# Patient Record
Sex: FEMALE | Race: ASIAN | NOT HISPANIC OR LATINO | Employment: STUDENT | URBAN - METROPOLITAN AREA
[De-identification: names, ages, dates, MRNs, and addresses within clinical notes are randomized per-mention and may not be internally consistent; named-entity substitution may affect disease eponyms.]

---
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
## 2021-04-07 ENCOUNTER — COMMUNICATION - HEALTHEAST (OUTPATIENT)
Dept: FAMILY MEDICINE | Facility: CLINIC | Age: 21
End: 2021-04-07

## 2021-04-07 ENCOUNTER — OFFICE VISIT - HEALTHEAST (OUTPATIENT)
Dept: FAMILY MEDICINE | Facility: CLINIC | Age: 21
End: 2021-04-07

## 2021-04-07 DIAGNOSIS — Z23 NEED FOR IMMUNIZATION AGAINST INFLUENZA: ICD-10-CM

## 2021-04-07 DIAGNOSIS — R35.0 URINARY FREQUENCY: ICD-10-CM

## 2021-04-07 DIAGNOSIS — B96.89 BV (BACTERIAL VAGINOSIS): ICD-10-CM

## 2021-04-07 DIAGNOSIS — E66.3 OVERWEIGHT (BMI 25.0-29.9): ICD-10-CM

## 2021-04-07 DIAGNOSIS — F51.01 PRIMARY INSOMNIA: ICD-10-CM

## 2021-04-07 DIAGNOSIS — N76.0 BV (BACTERIAL VAGINOSIS): ICD-10-CM

## 2021-04-07 DIAGNOSIS — B37.31 YEAST INFECTION OF THE VAGINA: ICD-10-CM

## 2021-04-07 DIAGNOSIS — Z11.3 SCREEN FOR STD (SEXUALLY TRANSMITTED DISEASE): ICD-10-CM

## 2021-04-07 DIAGNOSIS — N89.8 VAGINAL ITCHING: ICD-10-CM

## 2021-04-07 LAB
ALBUMIN UR-MCNC: NEGATIVE G/DL
ANION GAP SERPL CALCULATED.3IONS-SCNC: 7 MMOL/L (ref 5–18)
APPEARANCE UR: CLEAR
BACTERIA #/AREA URNS HPF: ABNORMAL /[HPF]
BILIRUB UR QL STRIP: NEGATIVE
BUN SERPL-MCNC: 14 MG/DL (ref 8–22)
CALCIUM SERPL-MCNC: 9.1 MG/DL (ref 8.5–10.5)
CHLORIDE BLD-SCNC: 107 MMOL/L (ref 98–107)
CLUE CELLS: ABNORMAL
CO2 SERPL-SCNC: 26 MMOL/L (ref 22–31)
COLOR UR AUTO: YELLOW
CREAT SERPL-MCNC: 0.79 MG/DL (ref 0.6–1.1)
GFR SERPL CREATININE-BSD FRML MDRD: >60 ML/MIN/1.73M2
GLUCOSE BLD-MCNC: 85 MG/DL (ref 70–125)
GLUCOSE UR STRIP-MCNC: NEGATIVE MG/DL
HBA1C MFR BLD: 5.5 %
HGB UR QL STRIP: NEGATIVE
KETONES UR STRIP-MCNC: NEGATIVE MG/DL
LEUKOCYTE ESTERASE UR QL STRIP: NEGATIVE
MUCOUS THREADS #/AREA URNS LPF: ABNORMAL LPF
NITRATE UR QL: NEGATIVE
PH UR STRIP: 6 [PH] (ref 5–8)
POTASSIUM BLD-SCNC: 4.2 MMOL/L (ref 3.5–5)
RBC #/AREA URNS AUTO: ABNORMAL HPF
SODIUM SERPL-SCNC: 140 MMOL/L (ref 136–145)
SP GR UR STRIP: >=1.03 (ref 1–1.03)
SQUAMOUS #/AREA URNS AUTO: ABNORMAL LPF
TRICHOMONAS, WET PREP: ABNORMAL
UROBILINOGEN UR STRIP-ACNC: ABNORMAL
WBC #/AREA URNS AUTO: ABNORMAL HPF
YEAST, WET PREP: ABNORMAL

## 2021-04-07 RX ORDER — HYDROXYZINE HYDROCHLORIDE 25 MG/1
25 TABLET, FILM COATED ORAL
Qty: 90 TABLET | Refills: 3 | Status: SHIPPED | OUTPATIENT
Start: 2021-04-07 | End: 2022-11-07

## 2021-04-07 ASSESSMENT — MIFFLIN-ST. JEOR: SCORE: 1340.05

## 2021-04-08 LAB — BACTERIA SPEC CULT: NO GROWTH

## 2021-04-09 LAB
C TRACH DNA SPEC QL PROBE+SIG AMP: NEGATIVE
N GONORRHOEA DNA SPEC QL NAA+PROBE: NEGATIVE

## 2021-05-30 VITALS — BODY MASS INDEX: 23.46 KG/M2 | WEIGHT: 116.38 LBS | HEIGHT: 59 IN

## 2021-05-30 VITALS — WEIGHT: 116.19 LBS | BODY MASS INDEX: 23.42 KG/M2 | HEIGHT: 59 IN

## 2021-05-30 VITALS — HEIGHT: 59 IN | BODY MASS INDEX: 23.65 KG/M2 | WEIGHT: 117.31 LBS

## 2021-05-31 VITALS — BODY MASS INDEX: 23.27 KG/M2 | HEIGHT: 59 IN | WEIGHT: 115.44 LBS

## 2021-05-31 VITALS — HEIGHT: 59 IN | BODY MASS INDEX: 22.67 KG/M2 | WEIGHT: 112.44 LBS

## 2021-05-31 VITALS — BODY MASS INDEX: 23.59 KG/M2 | HEIGHT: 59 IN | WEIGHT: 117 LBS

## 2021-05-31 NOTE — PROGRESS NOTES
"RADHA Dozier is a 18 y.o. female here for left ear pain.  Began 3 days ago and seems to be radiating up the right side of her head.  It is tender to palpation.  Her right ear does not hurt.  No past medical history on file.  Current Outpatient Medications on File Prior to Visit   Medication Sig Dispense Refill     cholecalciferol, vitamin D3, 5,000 unit capsule Take 5,000 Units by mouth 2 (two) times a week. 8 capsule 2     ibuprofen (ADVIL) 200 MG tablet Take 1 tablet  At bedtime 30 tablet 0     omeprazole (PRILOSEC) 20 MG capsule Take 1 capsule (20 mg total) by mouth 2 (two) times a day. 28 capsule 0     No current facility-administered medications on file prior to visit.      ?  O  /82   Pulse 68   Temp 98.7  F (37.1  C) (Oral)   Resp 16   Ht 4' 10.5\" (1.486 m)   Wt 129 lb 8 oz (58.7 kg)   LMP  (Approximate) Comment: Mid July 2019  SpO2 97% Comment: ra  BMI 26.60 kg/m     Vitals reviewed. Nursing note reviewed.  General Appearance: Pleasant and alert, in no acute distress  HEENT: Left external ear is red and inflamed just around opening.  TM is not bulging.  Right ear appears completely normal.  Mucous membranes moist  Skin: warm, dry, intact, no rash noted  Neuro: no focal deficits, CNs II-XII normal.   Psych: mood and affect are normal.    A/P  La was seen today for ear pain.    Diagnoses and all orders for this visit:    Acute otitis externa of left ear, unspecified type: Return if not improved by 1 week.  -     ofloxacin (FLOXIN) 0.3 % otic solution; Apply 1 drop to left ear 4 times per day for 1 week.         Return in about 3 months (around 11/7/2019) for Well Child Check.      The entire visit was conducted through a professional .   Options for treatment and follow-up care were reviewed with the patient and/or guardian. Mya Shaikh and/or guardian engaged in the decision making process and verbalized understanding of the options discussed and agreed with the final plan.    Betsy" MD Tita

## 2021-06-01 VITALS — WEIGHT: 115.31 LBS | HEIGHT: 59 IN | BODY MASS INDEX: 23.24 KG/M2

## 2021-06-02 VITALS — HEIGHT: 59 IN | BODY MASS INDEX: 24.39 KG/M2 | WEIGHT: 121 LBS

## 2021-06-03 VITALS — WEIGHT: 129.5 LBS | HEIGHT: 59 IN | BODY MASS INDEX: 26.11 KG/M2

## 2021-06-05 VITALS
HEIGHT: 59 IN | TEMPERATURE: 98.2 F | HEART RATE: 84 BPM | SYSTOLIC BLOOD PRESSURE: 100 MMHG | DIASTOLIC BLOOD PRESSURE: 70 MMHG | WEIGHT: 146.25 LBS | BODY MASS INDEX: 29.48 KG/M2 | RESPIRATION RATE: 16 BRPM
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
## 2021-06-08 NOTE — PROGRESS NOTES
"ASSESSMENT AND PLAN:  1. BPPV (benign paroxysmal positional vertigo), unspecified laterality   She complains of being dizzy.  She says the symptoms are persistent for years.  Her mother is giving him herbal medication.  However by physical therapy for repositioning.  Her Romberg's is positive  Ambulatory referral to Physical Therapy   2. Weakness  Basic Metabolic Panel   She complains of weakness she sleeps 2-1/2 hours every day after going home from school.  Hemogram was unremarkable checking ESR hepatic profile BMP today.  She is taking her vitamin D  Erythrocyte Sedimentation Rate    Hepatic Profile    Urinalysis-UC if Indicated   3. Headache her headaches have decreased the use of Naprosyn which she was taking incorrectly like her to continue that medication.        CHIEF COMPLAINT:  Chief Complaint   Patient presents with     Follow-up     pt not too sure, pt states one of the medication that she is taking is causing her dizziness and fatigue.       HISTORY OF PRESENT ILLNESS:  La is a 16 y.o. female presenting for a follow-up. La is present with a Susan  and mother. She is currently taking naproxen and vitamin D. Her headaches have improved but are still present. She notes that her headaches will worsen when she looks at bright lights. She notes that one of the medications is causing her to feel dizzy and fatigued but she notes that her dizziness began before taking the medication. She denies falls and emesis.     REVIEW OF SYSTEMS:   She reports she was seen by opthalmology and was told her eyes are \"good\" but when she reads, her eyes will become red.   She takes a several hour nap when she comes home from school.      She has been taking an herb from [place] for the past 2 years.    All other 10 point review of systems are negative.    PFSH:  Reviewed as below.     TOBACCO USE:  History   Smoking Status     Passive Smoke Exposure - Never Smoker   Smokeless Tobacco     Not on file " "      VITALS:  Vitals:    01/31/17 1616   BP: 114/68   Patient Site: Left Arm   Patient Position: Sitting   Cuff Size: Adult Regular   Pulse: 76   Resp: 20   Temp: 97.9  F (36.6  C)   TempSrc: Oral   Weight: 117 lb 5 oz (53.2 kg)   Height: 4' 10.5\" (1.486 m)     Wt Readings from Last 3 Encounters:   01/31/17 117 lb 5 oz (53.2 kg) (52 %, Z= 0.05)*   01/18/17 116 lb 6 oz (52.8 kg) (50 %, Z= 0.01)*   12/15/16 119 lb 4 oz (54.1 kg) (57 %, Z= 0.16)*     * Growth percentiles are based on [facility] 2-20 Years data.     Body mass index is 24.1 kg/(m^2).    PHYSICAL EXAM:  General: Alert, cooperative, no distress, appears stated age  Neck: No lymph node enlargement.   Ears: Normal TM's and external ear canals, both ears  Back: Symmetric, no curvature, ROM normal, no CVA tenderness  Chest wall: No tenderness or deformity  Heart: Regular rate and rhythm, S1 and S2 normal, no murmur, rub, or gallop  Neurologic:  A & O x 3.  No tremor, no focal findings.   Psych: Appropriate for age.      DATA REVIEWED:  Additional History from Old Records Summarized (2): None  Decision to Obtain Records (1): None  Radiology Tests Summarized or Ordered (1): None  Labs Reviewed or Ordered (1): Labs ordered.  Medicine Test Summarized or Ordered (1): None  Independent Review of EKG or X-RAY(2 each): None    The visit lasted a total of 11 minutes face to face with the patient. Over 50% of the time was spent counseling and educating the patient about dizziness.     Tete SAUCEDA, am scribing for and in the presence of, Dr. Corea.    Dr. Anmol SAUCEDA, personally performed the services described in this documentation, as scribed by Tete Bae in my presence, and it is both accurate and complete.      MEDICATIONS:  Current Outpatient Prescriptions   Medication Sig Dispense Refill     cholecalciferol, vitamin D3, 5,000 unit capsule Take 5,000 Units by mouth 2 (two) times a week. 8 capsule 0     ibuprofen (ADVIL) 200 MG tablet Take 1 tablet  At " bedtime 30 tablet 0     naproxen (NAPROSYN) 375 MG tablet Take 1 tablet (375 mg total) by mouth daily. 30 tablet 0     No current facility-administered medications for this visit.        Total Data Points: 1

## 2021-06-09 NOTE — PROGRESS NOTES
"ASSESSMENT AND PLAN:  1. BPPV (benign paroxysmal positional vertigo), unspecified laterality     Family history symptoms of vertigo decreased.  She needs to increase the fluid content in her diet.  She does not drink much water.  She is scheduled to be seen physical therapy for positional training in the next 2 weeks.      2. Vitamin D deficiency she had been taking vitamin D unclear she's been taking it faithfully refilled the medication however go back in 3 weeks with her medications she says she feels less weak     3. Headache she has been taking the Naprosyn, her headaches have decreased in frequency she did not keep her accurate count.  I'll have her continue same dose follow-upin 3 weeks         CHIEF COMPLAINT:  Chief Complaint   Patient presents with     Follow-up     bilateral ear       HISTORY OF PRESENT ILLNESS:  La is a 16 y.o. female presenting for a follow-up of headaches and dizziness. La is present with a Susan . She is currently taking naproxen for her headaches and states that she has a headache about once a week. She has been able to do more activities and homework due improvement of her frequent headaches. She states that her dizziness has improved since last visit. She has not fallen due to the dizziness. Of note, she drinks 1-2 cups of water a day.     Vitamin D Deficiency: She states that she is taking her Vitamin D faithfully.      REVIEW OF SYSTEMS:   She denies abdominal pain.    All other 10 point review of systems are negative.    PFSH:  Reviewed as below.     TOBACCO USE:  History   Smoking Status     Passive Smoke Exposure - Never Smoker   Smokeless Tobacco     Not on file       VITALS:  Vitals:    02/21/17 1526   BP: 104/66   Patient Site: Left Arm   Patient Position: Sitting   Cuff Size: Adult Regular   Pulse: 80   Resp: 20   Temp: 98.3  F (36.8  C)   TempSrc: Oral   Weight: 116 lb 3 oz (52.7 kg)   Height: 4' 11\" (1.499 m)     Wt Readings from Last 3 Encounters:   02/21/17 " 116 lb 3 oz (52.7 kg) (49 %, Z= -0.02)*   01/31/17 117 lb 5 oz (53.2 kg) (52 %, Z= 0.05)*   01/18/17 116 lb 6 oz (52.8 kg) (50 %, Z= 0.01)*     * Growth percentiles are based on [facility] 2-20 Years data.     Body mass index is 23.47 kg/(m^2).    PHYSICAL EXAM:  General: Alert, cooperative, no distress, appears stated age  Head: Normocephalic, without obvious abnormality, atraumatic  Ears: Normal TM's and external ear canals, both ears  Throat: Lips, mucosa, and tongue normal; teeth and gums normal  Back: Symmetric, no curvature, ROM normal, no CVA tenderness  Lungs: Clear to auscultation bilaterally, respirations unlabored  Chest wall: No tenderness or deformity  Heart: Regular rate and rhythm, S1 and S2 normal, no murmur, rub, or gallop  Neurologic:  A & O x 3.  No tremor, no focal findings.      DATA REVIEWED:  Additional History from Old Records Summarized (2): None   Decision to Obtain Records (1): None  Radiology Tests Summarized or Ordered (1): None  Labs Reviewed or Ordered (1): Reviewed Labs from 1/31/2017.   Medicine Test Summarized or Ordered (1): None  Independent Review of EKG or X-RAY(2 each): None    The visit lasted a total of 10 minutes face to face with the patient. Over 50% of the time was spent counseling and educating the patient about headaches.     ITete, am scribing for and in the presence of, Dr. Corea.    IDr. Corea, personally performed the services described in this documentation, as scribed by Tete Bae in my presence, and it is both accurate and complete.      MEDICATIONS:  Current Outpatient Prescriptions   Medication Sig Dispense Refill     cholecalciferol, vitamin D3, 5,000 unit capsule Take 5,000 Units by mouth 2 (two) times a week. 8 capsule 0     ibuprofen (ADVIL) 200 MG tablet Take 1 tablet  At bedtime 30 tablet 0     naproxen (NAPROSYN) 375 MG tablet Take 1 tablet (375 mg total) by mouth daily. 30 tablet 1     No current facility-administered medications for  this visit.        Total Data Points: 0

## 2021-06-11 NOTE — PROGRESS NOTES
"S:  Patient seen in clinic today for green card exam and update of immunizations.  There is no past history of immunization reactions.  No recent fevers or shortness of breath.     Patient Active Problem List   Diagnosis     BPPV (benign paroxysmal positional vertigo), unspecified laterality     Weakness     Headache       O:  BP 92/70 (Patient Site: Left Arm, Patient Position: Sitting, Cuff Size: Adult Regular)  Pulse 80  Temp 98.3  F (36.8  C) (Oral)   Resp 20  Ht 4' 10.75\" (1.492 m)  Wt 117 lb (53.1 kg)  LMP 06/01/2017  Breastfeeding? No  BMI 23.83 kg/m2  General - alert, NAD  CV - RRR  Resp - lunds clear to auscultation    A/P:  Green Card/update imm.  Counseling done on immunization history and requirements with the patient.  Reviewed available immunization history and relevant lab records with patient and family.  Immunizations given as documented in the EHR and on form.  Acetaminophen as needed for post-immunization fever or myalgias.  [place] Citizenship and Immigration Services form I-693 completed today with the patient.  Given to patient in a sealed labeled envelope and a copy is being scanned into the EHR.  Please see that form for further details.  Patient directed to follow-up in clinic for routine and preventative care.     "

## 2021-06-14 NOTE — PROGRESS NOTES
"ASSESSMENT AND PLAN:  1. H. pylori infection confirmed H. pylori infection by stool test.  Instructions given to patient with mom present.  Triple therapy prescribed for 2 weeks.      CHIEF COMPLAINT:  Chief Complaint   Patient presents with     Follow-up     abnormal lab       HISTORY OF PRESENT ILLNESS:  La is a 17 y.o. female presenting for a follow-up. La is present with a Susan . On 11/17/2017, she presented for an evaluation of abdominal pain. She had an h. Pylori test done which was positive. Currently, she states that she experiences epigastric abdominal pain when she eats spicy foods. Mother is agreeable to starting an h. Pylori antibiotic course.     REVIEW OF SYSTEMS:   She denies nausea and vomiting.    All other 10 point review of systems are negative.    PFSH:   Pertinent past, family, social and medical history reviewed.     TOBACCO USE:  History   Smoking Status     Passive Smoke Exposure - Never Smoker   Smokeless Tobacco     Not on file       VITALS:  Vitals:    12/13/17 1520   BP: 94/64   Patient Site: Left Arm   Patient Position: Sitting   Cuff Size: Adult Regular   Pulse: 80   Temp: 98.4  F (36.9  C)   TempSrc: Oral   SpO2: 98%   Weight: 115 lb 7 oz (52.4 kg)   Height: 4' 11\" (1.499 m)     Wt Readings from Last 3 Encounters:   12/13/17 115 lb 7 oz (52.4 kg) (42 %, Z= -0.21)*   11/17/17 112 lb 7 oz (51 kg) (36 %, Z= -0.37)*   06/21/17 117 lb (53.1 kg) (48 %, Z= -0.04)*     * Growth percentiles are based on CDC 2-20 Years data.     Body mass index is 23.32 kg/(m^2).    PHYSICAL EXAM:  General: Alert, cooperative, no distress, appears stated age  Head: Normocephalic, without obvious abnormality, atraumatic  Neurologic: No tremor, no focal findings.    Psych: Oriented x3. Affect normal.     DATA REVIEWED:  Additional History from Old Records Summarized (2): None  Decision to Obtain Records (1): None  Radiology Tests Summarized or Ordered (1): None  Labs Reviewed or Ordered (1): Reviewed " labs from 11/17/2017.   Medicine Test Summarized or Ordered (1): None  Independent Review of EKG or X-RAY(2 each): None    The visit lasted a total of 11 minutes face to face with the patient. Over 50% of the time was spent counseling and educating the patient about h. pylori treatment.     Tete SAUECDA, am scribing for and in the presence of, Dr. Corea.    blank SAUCEDA, personally performed the services described in this documentation, as scribed by Tete Bae in my presence, and it is both accurate and complete.      MEDICATIONS:  Current Outpatient Prescriptions   Medication Sig Dispense Refill     cholecalciferol, vitamin D3, 5,000 unit capsule Take 5,000 Units by mouth 2 (two) times a week. 8 capsule 2     ibuprofen (ADVIL) 200 MG tablet Take 1 tablet  At bedtime 30 tablet 0     No current facility-administered medications for this visit.        Total Data Points: 1

## 2021-06-14 NOTE — PROGRESS NOTES
"ASSESSMENT AND PLAN:  1. Lower abdominal pain she has intermittent pain that is present on the lower abdominal area.  It is not well localized.  Is not related to exertion, eating habits or bowel movements which are regular.  Mother has been giving her the medication which we can identify.  I want her not to take any of her mother's medicine will do an H. pylori test today child not missed any school no fever chills menstrual cramps have been noted. H. pylori Antigen, Stool    Varicella Zoster Immune Status Antibody, IgG   2. Encounter to vaccinate patient vaccination given today for varicella        CHIEF COMPLAINT:  Chief Complaint   Patient presents with     Abdominal Pain       HISTORY OF PRESENT ILLNESS:  La is a 17 y.o. female presenting with abdominal pain. La is present with a Susan  and mother. For the past few months, she has been experiencing abdominal pain. The pain occurs at least once a week and will persist until she takes her mother's GI medication and goes to sleep. Patient and Mother are unsure which medication it sounds like she is describing ranitidine. She describes the pain as a cramping and twisting pain. There is no correlation between types of food and her menstrual periods to her abdominal pain.     REVIEW OF SYSTEMS:   She currently denies headaches.    All other 10 point review of systems are negative.    PFSH:  . Pertinent past, family, social and medical history reviewed.     TOBACCO USE:  History   Smoking Status     Passive Smoke Exposure - Never Smoker   Smokeless Tobacco     Not on file       VITALS:  Vitals:    11/17/17 1156   BP: (!) 90/64   Patient Site: Right Arm   Patient Position: Sitting   Cuff Size: Adult Regular   Pulse: 80   Resp: 16   Temp: 98.1  F (36.7  C)   TempSrc: Oral   Weight: 112 lb 7 oz (51 kg)   Height: 4' 10.75\" (1.492 m)     Wt Readings from Last 3 Encounters:   11/17/17 112 lb 7 oz (51 kg) (36 %, Z= -0.37)*   06/21/17 117 lb (53.1 kg) (48 %, Z= " -0.04)*   02/21/17 116 lb 3 oz (52.7 kg) (49 %, Z= -0.02)*     * Growth percentiles are based on CDC 2-20 Years data.     Body mass index is 22.9 kg/(m^2).    PHYSICAL EXAM:  General: Alert, cooperative, no distress, appears stated age  Head: Normocephalic, without obvious abnormality, atraumatic  Lungs: Clear to auscultation bilaterally, respirations unlabored  Chest wall: No tenderness or deformity  Heart: Regular rate and rhythm, S1 and S2 normal, no murmur, rub, or gallop  CVS: No edema noted.   Abdomen: Soft, non tender, bowel sounds active all four quadrants, no masses, no organomegaly.  Neurologic: No tremor, no focal findings.     Psych: Oriented x3. Affect normal.     DATA REVIEWED:  Additional History from Old Records Summarized (2): None  Decision to Obtain Records (1): None  Radiology Tests Summarized or Ordered (1): None  Labs Reviewed or Ordered (1): Labs ordered.   Medicine Test Summarized or Ordered (1): None  Independent Review of EKG or X-RAY(2 each): None    The visit lasted a total of 10 minutes face to face with the patient. Over 50% of the time was spent counseling and educating the patient about abdominal pain.     ITete, am scribing for and in the presence of, Dr. Corea.    Iblank, personally performed the services described in this documentation, as scribed by Tete Bae in my presence, and it is both accurate and complete.      MEDICATIONS:  Current Outpatient Prescriptions   Medication Sig Dispense Refill     cholecalciferol, vitamin D3, 5,000 unit capsule Take 5,000 Units by mouth 2 (two) times a week. 8 capsule 2     ibuprofen (ADVIL) 200 MG tablet Take 1 tablet  At bedtime 30 tablet 0     No current facility-administered medications for this visit.        Total Data Points: 1

## 2021-06-15 PROBLEM — H81.10 BPPV (BENIGN PAROXYSMAL POSITIONAL VERTIGO), UNSPECIFIED LATERALITY: Status: ACTIVE | Noted: 2017-01-31

## 2021-06-15 PROBLEM — R51.9 HEADACHE: Status: ACTIVE | Noted: 2017-01-31

## 2021-06-15 PROBLEM — R53.1 WEAKNESS: Status: ACTIVE | Noted: 2017-01-31

## 2021-06-16 NOTE — TELEPHONE ENCOUNTER
Called Mya Shaikh and left VM with Susan  (called several times with no answer). Her wet prep showed both yeast and BV so I will send 2 medications to her pharmacy. Gave her a call back number in case she has questions.     Betsy Rivers MD

## 2021-06-20 NOTE — PROGRESS NOTES
"ASSESSMENT and plan  1. Immunization due  Immunizations were done today after obtaining verbal consent from the parents via phone  - Varicella vaccine subq  - Tdap vaccine,  8yo or older,  IM  - Meningococcal MCV4P    2. Physical exam, pre-employment  She has never been treated for latent TB has no recent cough fever night sweats shortness of breath or loss of weight.  QuantiFERON test will be done today.  - QTF-Mycobacterium tuberculosis by QuantiFERON-TB Gold Plus        There are no Patient Instructions on file for this visit.    Orders Placed This Encounter   Procedures     Varicella vaccine subq     Tdap vaccine,  8yo or older,  IM     Meningococcal MCV4P     QTF-Mycobacterium tuberculosis by QuantiFERON-TB Gold Plus     There are no discontinued medications.    No Follow-up on file.    CHIEF COMPLAINT:  Chief Complaint   Patient presents with     Tuberculosis screening       HISTORY OF PRESENT ILLNESS:  La is a 17 y.o. 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  She reports she is in good health.  She was [place] but has never been hospitalized or treated with long-term medications.  She believes she has never had TB-like symptoms.    REVIEW OF SYSTEMS:   10 point review of systems is negative      PFSH:  Lives with her family is going to grade 10 this year    TOBACCO USE:  History   Smoking Status     Passive Smoke Exposure - Never Smoker   Smokeless Tobacco     Never Used       VITALS:  Vitals:    08/22/18 1015   BP: 100/68   Pulse: 81   Resp: 20   Temp: 98.1  F (36.7  C)   TempSrc: Oral   SpO2: 98%   Weight: 115 lb 5 oz (52.3 kg)   Height: 4' 10.66\" (1.49 m)     Wt Readings from Last 3 Encounters:   08/22/18 115 lb 5 oz (52.3 kg) (37 %, Z= -0.33)*   12/13/17 115 lb 7 oz (52.4 kg) (42 %, Z= -0.21)*   11/17/17 112 lb 7 oz (51 kg) (36 %, Z= -0.37)*     * Growth percentiles are based on CDC 2-20 Years data.       PHYSICAL " EXAM:  Interactive teenager sitting in exam room in no acute distress  HEENT neck supple mucous membranes moist no lymph enlargement  Respiratory system clear to auscultation equal breath sounds no wheeze no crackles  CVS regular and rhythm no murmurs rubs gallops appreciated  Abdomen soft there is no focal tenderness no hepatosplenomegaly  Lymphatic system no lymph enlargement noted the neck axilla or supraclavicular areas    DATA REVIEWED:  Additional History from Old Records Summarized (2): 0  Decision to Obtain Records (1): 0  Radiology Tests Summarized or Ordered (1): 0  Labs Reviewed or Ordered (1): [email or web address]  Medicine Test Summarized or Ordered (1): 0  Independent Review of EKG or X-RAY(2 each): 0    The visit lasted a total of 25 minutes face to face with the patient. Over 50% of the time was spent counseling and educating the patient about latent tb.    MEDICATIONS:  Current Outpatient Prescriptions   Medication Sig Dispense Refill     cholecalciferol, vitamin D3, 5,000 unit capsule Take 5,000 Units by mouth 2 (two) times a week. 8 capsule 2     ibuprofen (ADVIL) 200 MG tablet Take 1 tablet  At bedtime 30 tablet 0     omeprazole (PRILOSEC) 20 MG capsule Take 1 capsule (20 mg total) by mouth 2 (two) times a day. 28 capsule 0     No current facility-administered medications for this visit.        Please note that this clinical encounter uses voice recognition software, there may be typographical errors present

## 2021-06-21 NOTE — PROGRESS NOTES
[facility] Well Child Check    ASSESSMENT & PLAN  Mya Shaikh is a 18  y.o. 1  m.o. who has normal growth and normal development.    1. [facility] (well child check)  - Vision Screening  - Hearing Screening  - PHQ9 Depression Screen    2. Failed vision screen  Has glasses at home, not wearing it daily.   Last eye exam more than one year ago.  - Ambulatory referral to Optometry    3. Need for influenza vaccination  - Influenza, Seasonal Quad, Preservative Free 36+ Months    Return to clinic in 1 year for a Well Child Check or sooner as needed    IMMUNIZATIONS/LABS  Immunizations were reviewed and orders were placed as appropriate. and I have discussed the risks and benefits of all of the vaccine components with the patient/parents.  All questions have been answered.    REFERRALS  Dental:  Recommend routine dental care as appropriate., The patient has already established care with a dentist.  Other:  Referrals were made for eye exam    ANTICIPATORY GUIDANCE  I have reviewed age appropriate anticipatory guidance.  Nutrition:  Body Image  Play and Communication:  Appropriate Use of TV and Read Books  Health:  Self-image building  Safety:  Seat Belts  Sexuality:  Safe Sex and STD's    HEALTH HISTORY  Do you have any concerns that you'd like to discuss today?: No concerns       Accompanied by Other Mother was called and agree  patient to be seen: mother at hospital delivery baby   Refills needed? No    Do you have any forms that need to be filled out? No     services provided by: Agency     /Agency Name [facility]   Location of  Services: In person    Are you using a form of contraception? No    If no, would you like to discuss with your clinician today? No        Do you have any significant health concerns in your family history?: No  Family History   Problem Relation Age of Onset     Depression Mother      Since your last visit, have there been any major changes in  your family, such as a move, job change, separation, divorce, or death in the family?: No  Has a lack of transportation kept you from medical appointments?: No    Home  Who lives in your home?:  Parents, 1 brother and 3 sisters   Social History     Social History Narrative     Do you have any concerns about losing your housing?: No  Is your housing safe and comfortable?: Yes  Do you have any trouble with sleep?:  No    Education  What school do you child attend?:  [employer]   What grade are you in?:  10th  How do you perform in school (grades, behavior, attention, homework?: good      Eating  Do you eat regular meals including fruits and vegetables?:  yes  What are you drinking (cow's milk, water, soda, juice, sports drinks, energy drinks, etc)?: cow's milk- skim and water  Have you been worried that you don't have enough food?: No  Do you have concerns about your body or appearance?:  No    Activities  Do you have friends?:  yes  Do you get at least one hour of physical activity per day?:  yes  How many hours a day are you in front of a screen other than for schoolwork (computer, TV, phone)?:  30 minutes   What do you do for exercise?:  Walking to school   Do you have interest/participate in community activities/volunteers/school sports?:  no    MENTAL HEALTH SCREENING  PHQ-2 Total Score: 0 (11/7/2018  4:34 PM)  Depression Follow-up Plan: mental health screening assessment (11/7/2018  4:34 PM)  PHQ-9 Total Score: 0 (11/7/2018  4:34 PM)    VISION/HEARING  Vision: Completed. See Results  Hearing:  Completed. See Results     Hearing Screening    Method: Audiometry    125Hz 250Hz 500Hz 1000Hz 2000Hz 3000Hz 4000Hz 6000Hz 8000Hz   Right ear:   Pass Pass Pass  Pass Pass    Left ear:   Fail Pass Pass  Pass Pass       Visual Acuity Screening    Right eye Left eye Both eyes   Without correction: 10/12.5 10/20 10/12.5   With correction:          TB Risk Assessment:  The patient and/or parent/guardian answer positive to:   "parents born outside of the [place]    Dyslipidemia Risk Screening  Have either of your parents or any of your grandparents had a stroke or heart attack before age 55?: No  Any parents with high cholesterol or currently taking medications to treat?: No     Dental  When was the last time you saw the dentist?: 6-12 months ago       Patient Active Problem List   Diagnosis     BPPV (benign paroxysmal positional vertigo), unspecified laterality     Weakness     Headache       Drugs  Does the patient use tobacco/alcohol/drugs?:  no    Safety  Does the patient have any safety concerns (peer or home)?:  no  Does the patient use safety belts, helmets and other safety equipment?:  yes    Sex  Have you ever had sex?:  No    MEASUREMENTS  Height:  4' 10.66\" (1.49 m)  Weight: 121 lb (54.9 kg)  BMI: Body mass index is 24.72 kg/(m^2).  Blood Pressure: 108/66  Blood pressure percentiles are 54 % systolic and 55 % diastolic based on the 2017 AAP Clinical Practice Guideline. Blood pressure percentile targets: 90: 120/77, 95: 126/80, 95 + 12 mmH/92.    PHYSICAL EXAM  Physical Exam  Head - Normal.  Eyes-symmetric corneal pinpoint reflex, symmetric red reflex.  Extraocular movement intact.  ENT-tympanic membranes are clear bilaterally.  Oropharynx is clear.  Neck-supple, no palpable mass or lymphadenopathy.  CV-regular rate and rhythm with no murmur.  Respiratory-lungs clear to auscultation.  Abdomen-soft, nontender, no palpable masses or organomegaly.  Genitourinary-normal appearance to external genitalia  Extremities-warm with no edema.  Neurologic-cranial nerves II through XII are intact, strength and sensation are symmetric.  Skin-no atypical appearing lesions, no rash.    "

## 2021-08-20 ENCOUNTER — HOSPITAL ENCOUNTER (EMERGENCY)
Facility: HOSPITAL | Age: 21
Discharge: HOME | End: 2021-08-20
Attending: EMERGENCY MEDICINE | Admitting: EMERGENCY MEDICINE
Payer: COMMERCIAL

## 2021-08-20 VITALS
RESPIRATION RATE: 18 BRPM | WEIGHT: 141 LBS | HEIGHT: 59 IN | SYSTOLIC BLOOD PRESSURE: 129 MMHG | OXYGEN SATURATION: 99 % | HEART RATE: 91 BPM | TEMPERATURE: 98.7 F | DIASTOLIC BLOOD PRESSURE: 89 MMHG | BODY MASS INDEX: 28.43 KG/M2

## 2021-08-20 DIAGNOSIS — L50.9 URTICARIA: ICD-10-CM

## 2021-08-20 LAB
ALBUMIN UR-MCNC: 10 MG/DL
APPEARANCE UR: CLEAR
BILIRUB UR QL STRIP: NEGATIVE
COLOR UR AUTO: YELLOW
GLUCOSE UR STRIP-MCNC: NEGATIVE MG/DL
HCG UR QL: NEGATIVE
HGB UR QL STRIP: NEGATIVE
KETONES UR STRIP-MCNC: NEGATIVE MG/DL
LEUKOCYTE ESTERASE UR QL STRIP: NEGATIVE
MUCOUS THREADS #/AREA URNS LPF: PRESENT /LPF
NITRATE UR QL: NEGATIVE
PH UR STRIP: 6 [PH] (ref 5–7)
RBC URINE: 1 /HPF
SP GR UR STRIP: 1.03 (ref 1–1.03)
SQUAMOUS EPITHELIAL: 2 /HPF
UROBILINOGEN UR STRIP-MCNC: <2 MG/DL
WBC URINE: 3 /HPF

## 2021-08-20 PROCEDURE — 250N000013 HC RX MED GY IP 250 OP 250 PS 637: Performed by: EMERGENCY MEDICINE

## 2021-08-20 PROCEDURE — 250N000012 HC RX MED GY IP 250 OP 636 PS 637: Performed by: EMERGENCY MEDICINE

## 2021-08-20 PROCEDURE — 81025 URINE PREGNANCY TEST: CPT | Performed by: EMERGENCY MEDICINE

## 2021-08-20 PROCEDURE — 99283 EMERGENCY DEPT VISIT LOW MDM: CPT

## 2021-08-20 PROCEDURE — 81001 URINALYSIS AUTO W/SCOPE: CPT | Performed by: EMERGENCY MEDICINE

## 2021-08-20 RX ORDER — PREDNISONE 20 MG/1
40 TABLET ORAL ONCE
Status: COMPLETED | OUTPATIENT
Start: 2021-08-20 | End: 2021-08-20

## 2021-08-20 RX ORDER — DIPHENHYDRAMINE HCL 25 MG
25 TABLET ORAL ONCE
Status: COMPLETED | OUTPATIENT
Start: 2021-08-20 | End: 2021-08-20

## 2021-08-20 RX ORDER — PREDNISONE 20 MG/1
TABLET ORAL
Qty: 10 TABLET | Refills: 0 | Status: SHIPPED | OUTPATIENT
Start: 2021-08-20 | End: 2022-11-07

## 2021-08-20 RX ORDER — DIPHENHYDRAMINE HCL 25 MG
25 CAPSULE ORAL EVERY 6 HOURS PRN
Qty: 20 CAPSULE | Refills: 0 | Status: SHIPPED | OUTPATIENT
Start: 2021-08-20 | End: 2022-11-07

## 2021-08-20 RX ADMIN — PREDNISONE 40 MG: 20 TABLET ORAL at 18:31

## 2021-08-20 RX ADMIN — DIPHENHYDRAMINE HCL 25 MG: 25 TABLET ORAL at 18:30

## 2021-08-20 ASSESSMENT — MIFFLIN-ST. JEOR: SCORE: 1320.2

## 2021-08-20 NOTE — ED PROVIDER NOTES
EMERGENCY DEPARTMENT ENCOUNTER      NAME: Mya Shaikh  AGE: 20 year old female  YOB: 2000  MRN: [id number]  EVALUATION DATE & TIME: 8/20/2021  5:38 PM    PCP: Allan Corea    ED PROVIDER: Milagros Burch M.D.      Chief Complaint   Patient presents with     Rash         FINAL IMPRESSION:  1. Urticaria          ED COURSE & MEDICAL DECISION MAKING:    ED Course as of Aug 20 1856   Fri Aug 20, 2021   1855 UA reassuringly without UTI and UPT negative, pt with itchy rash and normal VS, nontoxic appearing, and sparing mucous membranes, counselled change soaps/detergents and clsoe PDM f/u, and begun on steroids/benadryl in the ED. Patient discharged after being provided with extensive anticipatory guidance and given return precautions, importance of PMD follow-up emphasized.           Pertinent Labs & Imaging studies reviewed. (See chart for details)    Surgical mask and gloves worn    5:53 PM I met with the patient for the initial interview and physical examination. Discussed plan for treatment and workup in the ED.       At the conclusion of the encounter I discussed the results of all of the tests and the disposition. The questions were answered. The patient or family acknowledged understanding and was agreeable with the care plan.     MEDICATIONS GIVEN IN THE EMERGENCY:  Medications   predniSONE (DELTASONE) tablet 40 mg (40 mg Oral Given 8/20/21 1831)   diphenhydrAMINE (BENADRYL) tablet 25 mg (25 mg Oral Given 8/20/21 1830)       NEW PRESCRIPTIONS STARTED AT TODAY'S ER VISIT  New Prescriptions    DIPHENHYDRAMINE (BENADRYL) 25 MG CAPSULE    Take 1 capsule (25 mg) by mouth every 6 hours as needed for itching    PREDNISONE (DELTASONE) 20 MG TABLET    Take two tablets (= 40mg) each day for 5 (five) days          =================================================================    HPI      Mya Shaikh is a 20 year old female with no PMHx who presents to the ED today via walk in with rash. Patient developed an itchy  rash to her back ~1 week ago. She states that she was seen in clinic and was given a cream to apply, but the rash has worsened and started spreading to her face despite using the cream. No reported new soaps or detergents used at home. Patient adds that she has had 2-3 days of urinary frequency without dysuria; this feels like prior urinary tract infections. She denies shortness of breath, abdominal pain, nausea, vomiting, or additional symptoms at this time. Denies chance of pregnancy.          REVIEW OF SYSTEMS   All other systems reviewed and are negative except as noted above in HPI.    PAST MEDICAL HISTORY:  History reviewed. No pertinent past medical history.    PAST SURGICAL HISTORY:  History reviewed. No pertinent surgical history.    CURRENT MEDICATIONS:    diphenhydrAMINE (BENADRYL) 25 MG capsule  predniSONE (DELTASONE) 20 MG tablet  cholecalciferol, vitamin D3, 5,000 unit capsule  hydrOXYzine HCL (ATARAX) 25 MG tablet  ibuprofen (ADVIL) 200 MG tablet  omeprazole (PRILOSEC) 20 MG capsule        ALLERGIES:  No Known Allergies    FAMILY HISTORY:  Family History   Problem Relation Age of Onset     Depression Mother        SOCIAL HISTORY:   Social History     Socioeconomic History     Marital status: Single     Spouse name: Not on file     Number of children: Not on file     Years of education: Not on file     Highest education level: Not on file   Occupational History     Not on file   Tobacco Use     Smoking status: Passive Smoke Exposure - Never Smoker     Smokeless tobacco: Never Used     Tobacco comment: dad smokes outside   Substance and Sexual Activity     Alcohol use: No     Drug use: No     Sexual activity: Never   Other Topics Concern     Not on file   Social History Narrative     Not on file     Social Determinants of Health     Financial Resource Strain:      Difficulty of Paying Living Expenses:    Food Insecurity:      Worried About Running Out of Food in the Last Year:      Ran Out of Food in  "the Last Year:    Transportation Needs:      Lack of Transportation (Medical):      Lack of Transportation (Non-Medical):    Physical Activity:      Days of Exercise per Week:      Minutes of Exercise per Session:    Stress:      Feeling of Stress :    Social Connections:      Frequency of Communication with Friends and Family:      Frequency of Social Gatherings with Friends and Family:      Attends Zoroastrianism Services:      Active Member of Clubs or Organizations:      Attends Club or Organization Meetings:      Marital Status:    Intimate Partner Violence:      Fear of Current or Ex-Partner:      Emotionally Abused:      Physically Abused:      Sexually Abused:        VITALS:  Patient Vitals for the past 24 hrs:   BP Temp Temp src Pulse Resp SpO2 Height Weight   08/20/21 1447 129/89 98.7  F (37.1  C) Oral 91 18 99 % 1.499 m (4' 11\") 64 kg (141 lb)       PHYSICAL EXAM    GENERAL: Awake, alert.  In no acute distress.   HEENT: Normocephalic, atraumatic.  Pupils equal, round and reactive.  Conjunctiva normal.  EOMI.  NECK: No stridor or apparent deformity.  PULMONARY: Symmetrical breath sounds without distress.  Lungs clear to auscultation bilaterally without wheezes, rhonchi or rales.  CARDIO: Regular rate and rhythm.  No significant murmur, rub or gallop.  Radial pulses strong and symmetrical.  ABDOMINAL: Abdomen soft, non-distended and non-tender to palpation.  No CVAT, no palpable hepatosplenomegaly.  EXTREMITIES: No lower extremity swelling or edema.    NEURO: Alert and oriented to person, place and time.  Cranial nerves grossly intact.  No focal motor deficit.  PSYCH: Normal mood and affect  SKIN: Upper thoracic back with maculopapular excoriated raised red bumpy rash with involvement of the bilateral cheeks L > R.      LAB:  All pertinent labs reviewed and interpreted.  Results for orders placed or performed during the hospital encounter of 08/20/21   HCG qualitative urine   Result Value Ref Range    hCG Urine " Qualitative Negative Negative   UA with Microscopic reflex to Culture    Specimen: Urine, Clean Catch   Result Value Ref Range    Color Urine Yellow Colorless, Straw, Light Yellow, Yellow    Appearance Urine Clear Clear    Glucose Urine Negative Negative mg/dL    Bilirubin Urine Negative Negative    Ketones Urine Negative Negative mg/dL    Specific Gravity Urine 1.034 (H) 1.001 - 1.030    Blood Urine Negative Negative    pH Urine 6.0 5.0 - 7.0    Protein Albumin Urine 10  (A) Negative mg/dL    Urobilinogen Urine <2.0 <2.0 mg/dL    Nitrite Urine Negative Negative    Leukocyte Esterase Urine Negative Negative    Mucus Urine Present (A) None Seen /LPF    RBC Urine 1 <=2 /HPF    WBC Urine 3 <=5 /HPF    Squamous Epithelials Urine 2 (H) <=1 /HPF             I, Geni Phillips, am serving as a scribe to document services personally performed by Dr. Milagros Burch based on my observation and the provider's statements to me. I, Milagros Burch MD attest that Geni Phillips is acting in a scribe capacity, has observed my performance of the services and has documented them in accordance with my direction.     Milagros Burch MD  08/20/21 4751

## 2021-08-20 NOTE — ED TRIAGE NOTES
Patient has had a rash on her back starting about a week ago, is raised pustules, increasing in size, itchy.

## 2021-08-24 ENCOUNTER — TELEPHONE (OUTPATIENT)
Dept: FAMILY MEDICINE | Facility: CLINIC | Age: 21
End: 2021-08-24

## 2021-08-25 ENCOUNTER — TELEPHONE (OUTPATIENT)
Dept: FAMILY MEDICINE | Facility: CLINIC | Age: 21
End: 2021-08-25

## 2021-08-25 NOTE — TELEPHONE ENCOUNTER
Reason for Call:  Same Day Appointment, Requested Provider:      PCP: Allan Corea    Reason for visit:  TB testing for employment      Duration of symptoms:     Have you been treated for this in the past? Na     Additional comments:  Please call and assist patient with scheduling    Can we leave a detailed message on this number? YES    Phone number patient can be reached at: Cell number on file:    Telephone Information:   Mobile [phone]       Best Time:  anytime    Call taken on 8/25/2021 at 4:27 PM by Patricia Montoya

## 2021-08-26 ENCOUNTER — OFFICE VISIT (OUTPATIENT)
Dept: FAMILY MEDICINE | Facility: CLINIC | Age: 21
End: 2021-08-26
Payer: COMMERCIAL

## 2021-08-26 VITALS
SYSTOLIC BLOOD PRESSURE: 102 MMHG | WEIGHT: 139.56 LBS | OXYGEN SATURATION: 99 % | DIASTOLIC BLOOD PRESSURE: 78 MMHG | HEART RATE: 92 BPM | TEMPERATURE: 98.3 F | BODY MASS INDEX: 28.19 KG/M2

## 2021-08-26 DIAGNOSIS — L30.9 DERMATITIS: Primary | ICD-10-CM

## 2021-08-26 DIAGNOSIS — Z11.1 ENCOUNTER FOR TB TINE TEST: ICD-10-CM

## 2021-08-26 PROCEDURE — 36415 COLL VENOUS BLD VENIPUNCTURE: CPT | Performed by: FAMILY MEDICINE

## 2021-08-26 PROCEDURE — 86481 TB AG RESPONSE T-CELL SUSP: CPT | Performed by: FAMILY MEDICINE

## 2021-08-26 PROCEDURE — 99214 OFFICE O/P EST MOD 30 MIN: CPT | Performed by: FAMILY MEDICINE

## 2021-08-26 RX ORDER — TRIAMCINOLONE ACETONIDE 1 MG/G
CREAM TOPICAL 2 TIMES DAILY
Qty: 85.2 G | Refills: 3 | Status: SHIPPED | OUTPATIENT
Start: 2021-08-26 | End: 2022-04-20

## 2021-08-26 NOTE — PROGRESS NOTES
ASSESSMENT and plan  1. Dermatitis  Area of irritated skin noted on her back.  There is no signs of excoriation presently she will avoid taking Benadryl as it makes her sleepy I did not refill her prednisone after explained the side effects to arrive given her a medium potency corticosteroid to be applied to the area if her symptoms continue she may need allergy testing.  - triamcinolone (KENALOG) 0.1 % external cream; Apply topically 2 times daily  Dispense: 85.2 g; Refill: 3    2. 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  - Quantiferon TB Gold Plus; Future  - Quantiferon TB Gold Plus        There are no Patient Instructions on file for this visit.    No orders of the defined types were placed in this encounter.    There are no discontinued medications.    Return in about 6 months (around 2/26/2022) for Routine preventive.    CHIEF COMPLAINT:  chief complaint    HISTORY OF PRESENT ILLNESS:  La is a 20 year old female   Who is here because she was in the emergency room last week with itchy skin and hives they gave her 2 medications she is no longer itchy but still has a rash on her back she is wondering if she needs a refill of medication or she should continue taking the other medication for itching it makes her very sleepy.  She also wants a TB test because she is continuing to work as a PCA.  She denies any recent travel.  She has not had fever chills night sweats or weakness.  She has had no cough.  Her weights been stable.    REVIEW OF SYSTEMS:     Derm positive for itchy skin on her back which is now improved and is no longer itchy  10 point review of  All other systems are negative.    PFSH:    Social history reviewed    TOBACCO USE:  History   Smoking Status     Passive Smoke Exposure - Never Smoker   Smokeless Tobacco     Never Used     Comment: dad smokes outside       VITALS:  Vitals:     08/26/21 1609   BP: 102/78   BP Location: Right arm   Patient Position: Sitting   Cuff Size: Adult Regular   Pulse: 92   Temp: 98.3  F (36.8  C)   TempSrc: Oral   SpO2: 99%   Weight: 63.3 kg (139 lb 9 oz)     Wt Readings from Last 3 Encounters:   08/26/21 63.3 kg (139 lb 9 oz) (68 %, Z= 0.48)*   08/20/21 64 kg (141 lb) (70 %, Z= 0.53)*   04/07/21 66.3 kg (146 lb 4 oz) (77 %, Z= 0.74)*     * Growth percentiles are based on CDC (Girls, 2-20 Years) data.       PHYSICAL EXAM:    Interactive female sitting comfortably in exam room no acute distress  HEENT neck supple mucous members moist with no lymph enlargement noted in the neck.  Lymphatic system there is no axillary supraclavicular lymph node enlargement noted  Respiratory system clear to auscultation equal breath sounds no wheezes no crackles  CVS regular rate and rhythm no murmurs rubs gallops appreciated  Skin erythema noted on the back in the intrascapular area there is hyperpigmented areas which indicate previous excoriation no lesions noted.    DATA REVIEWED:  Additional History from Old Records Summarized (2): 0  Decision to Obtain Records (1):   Radiology Tests Summarized or Ordered (1): 0  Labs Reviewed or Ordered (1): 0  Medicine Test Summarized or Ordered (1): 0  Independent Review of EKG or X-RAY(2 each): 0    The visit lasted a total of 30 minutes .    MEDICATIONS:  Current Outpatient Medications   Medication Sig Dispense Refill     predniSONE (DELTASONE) 20 MG tablet Take two tablets (= 40mg) each day for 5 (five) days 10 tablet 0     triamcinolone (KENALOG) 0.1 % external cream Apply topically 2 times daily 85.2 g 3     cholecalciferol, vitamin D3, 5,000 unit capsule [CHOLECALCIFEROL, VITAMIN D3, 5,000 UNIT CAPSULE] Take 5,000 Units by mouth 2 (two) times a week. (Patient not taking: Reported on 8/26/2021) 8 capsule 2     diphenhydrAMINE (BENADRYL) 25 MG capsule Take 1 capsule (25 mg) by mouth every 6 hours as needed for itching (Patient not taking:  Reported on 8/26/2021) 20 capsule 0     hydrOXYzine HCL (ATARAX) 25 MG tablet [HYDROXYZINE HCL (ATARAX) 25 MG TABLET] Take 1 tablet (25 mg total) by mouth at bedtime as needed (sleep). (Patient not taking: Reported on 8/26/2021) 90 tablet 3     ibuprofen (ADVIL) 200 MG tablet [IBUPROFEN (ADVIL) 200 MG TABLET] Take 1 tablet  At bedtime (Patient not taking: Reported on 8/26/2021) 30 tablet 0     omeprazole (PRILOSEC) 20 MG capsule [OMEPRAZOLE (PRILOSEC) 20 MG CAPSULE] Take 1 capsule (20 mg total) by mouth 2 (two) times a day. (Patient not taking: Reported on 8/26/2021) 28 capsule 0

## 2021-08-27 ENCOUNTER — OFFICE VISIT (OUTPATIENT)
Dept: FAMILY MEDICINE | Facility: CLINIC | Age: 21
End: 2021-08-27
Payer: COMMERCIAL

## 2021-08-27 VITALS
BODY MASS INDEX: 28.37 KG/M2 | DIASTOLIC BLOOD PRESSURE: 80 MMHG | SYSTOLIC BLOOD PRESSURE: 100 MMHG | RESPIRATION RATE: 20 BRPM | TEMPERATURE: 98.8 F | HEART RATE: 86 BPM | OXYGEN SATURATION: 97 % | HEIGHT: 59 IN | WEIGHT: 140.75 LBS

## 2021-08-27 DIAGNOSIS — L50.9 URTICARIA: Primary | ICD-10-CM

## 2021-08-27 PROCEDURE — 99213 OFFICE O/P EST LOW 20 MIN: CPT | Performed by: FAMILY MEDICINE

## 2021-08-27 ASSESSMENT — MIFFLIN-ST. JEOR: SCORE: 1313.68

## 2021-08-30 LAB
GAMMA INTERFERON BACKGROUND BLD IA-ACNC: 0.6 IU/ML
M TB IFN-G BLD-IMP: NEGATIVE
M TB IFN-G CD4+ BCKGRND COR BLD-ACNC: 9.4 IU/ML
MITOGEN IGNF BCKGRD COR BLD-ACNC: -0.14 IU/ML
MITOGEN IGNF BCKGRD COR BLD-ACNC: 0.03 IU/ML
QUANTIFERON MITOGEN: 10 IU/ML
QUANTIFERON NIL TUBE: 0.6 IU/ML
QUANTIFERON TB1 TUBE: 0.46 IU/ML
QUANTIFERON TB2 TUBE: 0.63

## 2021-09-04 NOTE — PROGRESS NOTES
"    Assessment & Plan     Urticaria    Use the triamcinolone.    Do not receive the second dose of Covid vaccine, due to urticaria.  I listed this as an allergy.             BMI:   Estimated body mass index is 28.76 kg/m  as calculated from the following:    Height as of this encounter: 1.49 m (4' 10.66\").    Weight as of this encounter: 63.8 kg (140 lb 12 oz).           Return in about 1 year (around 8/27/2022) for Routine preventive.    Hang Boston MD, MD  [facility] JESE Roque is a 20 year old who presents for the following health issues     HPI     She received Covid vaccine 8/18/21.  She developed urticaria and went to ER 8/20/21.  Started prednisone.    Saw Dr. Corea yesterday.  Triamcinolone cream was prescribed.  She obtained it, but has not started to use it yet.  She states that the rash is less red.    Current Outpatient Medications   Medication Sig Dispense Refill     cholecalciferol, vitamin D3, 5,000 unit capsule [CHOLECALCIFEROL, VITAMIN D3, 5,000 UNIT CAPSULE] Take 5,000 Units by mouth 2 (two) times a week. (Patient not taking: Reported on 8/26/2021) 8 capsule 2     diphenhydrAMINE (BENADRYL) 25 MG capsule Take 1 capsule (25 mg) by mouth every 6 hours as needed for itching (Patient not taking: Reported on 8/26/2021) 20 capsule 0     hydrOXYzine HCL (ATARAX) 25 MG tablet [HYDROXYZINE HCL (ATARAX) 25 MG TABLET] Take 1 tablet (25 mg total) by mouth at bedtime as needed (sleep). (Patient not taking: Reported on 8/26/2021) 90 tablet 3     ibuprofen (ADVIL) 200 MG tablet [IBUPROFEN (ADVIL) 200 MG TABLET] Take 1 tablet  At bedtime (Patient not taking: Reported on 8/26/2021) 30 tablet 0     omeprazole (PRILOSEC) 20 MG capsule [OMEPRAZOLE (PRILOSEC) 20 MG CAPSULE] Take 1 capsule (20 mg total) by mouth 2 (two) times a day. (Patient not taking: Reported on 8/26/2021) 28 capsule 0     predniSONE (DELTASONE) 20 MG tablet Take two tablets (= 40mg) each day for 5 (five) days " "(Patient not taking: Reported on 8/27/2021) 10 tablet 0     triamcinolone (KENALOG) 0.1 % external cream Apply topically 2 times daily (Patient not taking: Reported on 8/27/2021) 85.2 g 3         Review of Systems   No wheezing, cough, or throat tightness.      Objective    /80 (BP Location: Right arm, Patient Position: Sitting, Cuff Size: Adult Regular)   Pulse 86   Temp 98.8  F (37.1  C) (Oral)   Resp 20   Ht 1.49 m (4' 10.66\")   Wt 63.8 kg (140 lb 12 oz)   LMP 08/16/2021 (Exact Date)   SpO2 97%   BMI 28.76 kg/m    Body mass index is 28.76 kg/m .  Physical Exam   Heart normal  Lungs normal  Skin: most of upper and middle back covered by red papules of varying sizes            "

## 2022-01-10 ENCOUNTER — IMMUNIZATION (OUTPATIENT)
Dept: NURSING | Facility: CLINIC | Age: 22
End: 2022-01-10
Payer: COMMERCIAL

## 2022-01-10 PROCEDURE — 91305 COVID-19,PF,PFIZER (12+ YRS): CPT

## 2022-01-10 PROCEDURE — 0052A COVID-19,PF,PFIZER (12+ YRS): CPT

## 2022-04-20 DIAGNOSIS — L30.9 DERMATITIS: ICD-10-CM

## 2022-04-20 DIAGNOSIS — Z76.0 ENCOUNTER FOR MEDICATION REFILL: Primary | ICD-10-CM

## 2022-04-20 RX ORDER — TRIAMCINOLONE ACETONIDE 1 MG/G
CREAM TOPICAL 2 TIMES DAILY
Qty: 85.2 G | Refills: 3 | Status: SHIPPED | OUTPATIENT
Start: 2022-04-20 | End: 2022-11-07

## 2022-04-20 NOTE — TELEPHONE ENCOUNTER
Reason for Call:  Medication or medication refill:    Do you use a [facility]? No      Name of the pharmacy and phone number for the current request:   [facility] @ [phone]    Name of the medication requested: Triamcinolone 0.1% cream - apply topically to butt twice a day    Office visit - upcoming on 5/25/22 at 340 pm     Can we leave a detailed message on this number? NO    Phone number patient can be reached at: Home number on file [phone] (home)    Best Time: anytime    Call taken on 4/20/2022 at 4:08 PM by Gisela Coughlin CMA, CMT      Patient Quality Outreach    Patient is due for the following:   Depression  -  PHQ-9 Needed  Immunizations  -  Covid    NEXT STEPS:   Patient was scheduled for an appointment.    Type of outreach:    Phone, spoke to patient/parent. 5/25/22 at 340 pm      Questions for provider review:    None     Gisela Coughlin CMA, CMT

## 2022-05-25 ENCOUNTER — OFFICE VISIT (OUTPATIENT)
Dept: FAMILY MEDICINE | Facility: CLINIC | Age: 22
End: 2022-05-25
Payer: COMMERCIAL

## 2022-05-25 VITALS
HEIGHT: 59 IN | HEART RATE: 88 BPM | BODY MASS INDEX: 28.22 KG/M2 | SYSTOLIC BLOOD PRESSURE: 102 MMHG | TEMPERATURE: 98 F | DIASTOLIC BLOOD PRESSURE: 70 MMHG | WEIGHT: 140 LBS | OXYGEN SATURATION: 98 %

## 2022-05-25 DIAGNOSIS — L30.9 DERMATITIS: Primary | ICD-10-CM

## 2022-05-25 PROCEDURE — 99213 OFFICE O/P EST LOW 20 MIN: CPT | Performed by: FAMILY MEDICINE

## 2022-05-25 RX ORDER — CETIRIZINE HYDROCHLORIDE 10 MG/1
10 TABLET ORAL DAILY
Qty: 30 TABLET | Refills: 3 | Status: SHIPPED | OUTPATIENT
Start: 2022-05-25 | End: 2022-11-07

## 2022-05-25 RX ORDER — CLOBETASOL PROPIONATE 0.5 MG/G
CREAM TOPICAL 2 TIMES DAILY
Qty: 60 G | Refills: 1 | Status: SHIPPED | OUTPATIENT
Start: 2022-05-25 | End: 2022-11-07

## 2022-05-25 NOTE — PATIENT INSTRUCTIONS
Prescribed 2 medications for you 1 is a cream that should prevent itching please apply to the area twice a day if you can please do not apply this on your face      I also prescribed a medication to prevent itching take this at bedtime as it does make you sleepy I would like to see you back in approximately 6 weeks

## 2022-05-25 NOTE — PROGRESS NOTES
ASSESSMENT and plan   1. Dermatitis  Mild erythema noted with papular rash on the supra scapular area bilaterally.  Minimal signs of excoriation papules developing on the right side of the neck.  Differential would include potential viral infection i.e. molluscum however there is no central clearing of the papules the skin lesion appears to be more pronounced in more pruritic after bathing so it could indicate histamine release and would point more to the favor an allergy I am increasing the potency of the steroid and I am giving her an antihistamine to try to avoid scratching the area follow-up recommended in 6 weeks  - clobetasol (TEMOVATE) 0.05 % external cream; Apply topically 2 times daily  Dispense: 60 g; Refill: 1  - cetirizine (ZYRTEC) 10 MG tablet; Take 1 tablet (10 mg) by mouth daily  Dispense: 30 tablet; Refill: 3        Patient Instructions   Prescribed 2 medications for you 1 is a cream that should prevent itching please apply to the area twice a day if you can please do not apply this on your face      I also prescribed a medication to prevent itching take this at bedtime as it does make you sleepy I would like to see you back in approximately 6 weeks      Orders Placed This Encounter   Procedures     REVIEW OF HEALTH MAINTENANCE PROTOCOL ORDERS     There are no discontinued medications.    Follow-up in 6 weeks    CHIEF COMPLAINT:  chief complaint rash on back of neck    HISTORY OF PRESENT ILLNESS:  La is a 21 year old female is here for a follow-up of her rash I have previously seen her for this last August she reports that the medication to give her the time did slow the rash down but never eliminated it she said over the last 6 weeks its been very itchy and she is avoided taking hot showers because it seems to complain of the rash.  She says the rash is now spreading from behind her back and neck to the side of her neck.  She denies the rash being present anywhere else.    REVIEW OF SYSTEMS:   Derm  "positive for rash as mentioned in HPI otherwise seven-point review of  All other systems are negative.    PFSH:    Social history reviewed    TOBACCO USE:  History   Smoking Status     Passive Smoke Exposure - Never Smoker   Smokeless Tobacco     Never Used     Comment: dad smokes outside       VITALS:  Vitals:    05/25/22 1551   BP: 102/70   Pulse: 88   Temp: 98  F (36.7  C)   TempSrc: Oral   SpO2: 98%   Weight: 63.5 kg (140 lb)   Height: 1.49 m (4' 10.66\")     Wt Readings from Last 3 Encounters:   05/25/22 63.5 kg (140 lb)   08/27/21 63.8 kg (140 lb 12 oz) (70 %, Z= 0.52)*   08/26/21 63.3 kg (139 lb 9 oz) (68 %, Z= 0.48)*     * Growth percentiles are based on [facility] (Girls, 2-20 Years) data.       PHYSICAL EXAM:  Interactive female sitting comfortably exam room no acute distress  HEENT neck supple mucous members moist there is no lymph enlargement in the neck  Skin multiple papules noted in the supra scapular area bilaterally and in the infrascapular area on the left side.  Signs of excoriation noted in the mid thoracic area 5 papules noted over the right side of the neck.    DATA REVIEWED:  Additional History from Old Records Summarized (2): 0  Decision to Obtain Records (1): 0  Radiology Tests Summarized or Ordered (1): 0  Labs Reviewed or Ordered (1): 0  Medicine Test Summarized or Ordered (1): 0  Independent Review of EKG or X-RAY(2 each): 0    The visit lasted a total of 20 minutes .    MEDICATIONS:  Current Outpatient Medications   Medication Sig Dispense Refill     cetirizine (ZYRTEC) 10 MG tablet Take 1 tablet (10 mg) by mouth daily 30 tablet 3     clobetasol (TEMOVATE) 0.05 % external cream Apply topically 2 times daily 60 g 1     cholecalciferol, vitamin D3, 5,000 unit capsule [CHOLECALCIFEROL, VITAMIN D3, 5,000 UNIT CAPSULE] Take 5,000 Units by mouth 2 (two) times a week. (Patient not taking: Reported on 8/26/2021) 8 capsule 2     diphenhydrAMINE (BENADRYL) 25 MG capsule Take 1 capsule (25 mg) by mouth " every 6 hours as needed for itching (Patient not taking: Reported on 8/26/2021) 20 capsule 0     hydrOXYzine HCL (ATARAX) 25 MG tablet [HYDROXYZINE HCL (ATARAX) 25 MG TABLET] Take 1 tablet (25 mg total) by mouth at bedtime as needed (sleep). (Patient not taking: Reported on 8/26/2021) 90 tablet 3     ibuprofen (ADVIL) 200 MG tablet [IBUPROFEN (ADVIL) 200 MG TABLET] Take 1 tablet  At bedtime (Patient not taking: Reported on 8/26/2021) 30 tablet 0     omeprazole (PRILOSEC) 20 MG capsule [OMEPRAZOLE (PRILOSEC) 20 MG CAPSULE] Take 1 capsule (20 mg total) by mouth 2 (two) times a day. (Patient not taking: Reported on 8/26/2021) 28 capsule 0     predniSONE (DELTASONE) 20 MG tablet Take two tablets (= 40mg) each day for 5 (five) days (Patient not taking: Reported on 8/27/2021) 10 tablet 0     triamcinolone (KENALOG) 0.1 % external cream Apply topically 2 times daily 85.2 g 3

## 2022-07-31 NOTE — PROGRESS NOTES
"RADHA Dozier is a 20 y.o. female here for -  Urinary frequency x1 month. Has to wake up at night, though hasn't wet the bed. Waking up 4-5 times at night.   No pain with urination. No blood in urine.   Doesn't think she is more thirsty than she should be. In the afternoon and evening, she feels thirstier after eating. Tries not to drink too much water before bed.    Neither parent has diabetes      She does have itching of her vaginal area. No unusual discharge.   ?  O  /70   Pulse 84   Temp 98.2  F (36.8  C) (Oral)   Resp 16   Ht 4' 10.75\" (1.492 m)   Wt 146 lb 4 oz (66.3 kg)   LMP  (Approximate) Comment: mid March  Breastfeeding No   BMI 29.79 kg/m     Vitals reviewed. Nursing note reviewed.  General Appearance: Pleasant and alert, in no acute distress  HEENT: mucous membranes moist  Pelvic:  Vulva: normal skin.  No lesions noted.  Nontender.    Vagina: normal appearance, physiologic discharge.   Skin: warm, dry, intact, no rash noted  Neuro: no focal deficits, CNs II-XII normal.   Psych: mood and affect are normal.    A/P  La was seen today for urinary frequency.    Diagnoses and all orders for this visit:    Urinary frequency: UA appears benign, but will get culture to double check for bacteria.  Her wet prep did come back showing both yeast and BV.  I saw this after she had left and called and left a message about treating.  -     Urinalysis-UC if Indicated  -     Cancel: Glucose  -     Basic Metabolic Panel  -     Glycosylated Hemoglobin A1c    Primary insomnia: discussed that at this point, we don't have a good reason for her urinary frequency but sometimes anxiety can affect this. Offered to try hydroxyzine and she agreed.     Yeast infection of vagina:     BV (bacterial vaginosis):     Need for immunization against influenza  -     Influenza, Seasonal Quad, PF =/> 6months    Overweight (BMI 25.0-29.9): Has gained 17 lbs in the past 18 months.    -     Glycosylated Hemoglobin A1c    Screen for " STD (sexually transmitted disease)  -     Chlamydia trachomatis & Neisseria gonorrhoeae, Amplified Detection        Return in about 1 month (around 5/7/2021) for recheck.      The entire visit was conducted through a professional .   Options for treatment and follow-up care were reviewed with the patient and/or guardian. Mya Shaikh and/or guardian engaged in the decision making process and verbalized understanding of the options discussed and agreed with the final plan.    Betsy Rivers MD       English

## 2022-08-03 ENCOUNTER — OFFICE VISIT (OUTPATIENT)
Dept: FAMILY MEDICINE | Facility: CLINIC | Age: 22
End: 2022-08-03
Payer: COMMERCIAL

## 2022-08-03 VITALS
SYSTOLIC BLOOD PRESSURE: 98 MMHG | BODY MASS INDEX: 28.43 KG/M2 | OXYGEN SATURATION: 97 % | DIASTOLIC BLOOD PRESSURE: 66 MMHG | TEMPERATURE: 98.5 F | HEART RATE: 86 BPM | WEIGHT: 141 LBS | RESPIRATION RATE: 20 BRPM | HEIGHT: 59 IN

## 2022-08-03 DIAGNOSIS — Z11.59 ENCOUNTER FOR HEPATITIS C SCREENING TEST FOR LOW RISK PATIENT: ICD-10-CM

## 2022-08-03 DIAGNOSIS — Z23 HIGH PRIORITY FOR COVID-19 VACCINATION: ICD-10-CM

## 2022-08-03 DIAGNOSIS — Z11.4 SCREENING FOR HIV (HUMAN IMMUNODEFICIENCY VIRUS): ICD-10-CM

## 2022-08-03 DIAGNOSIS — Z76.89 ENCOUNTER TO ESTABLISH CARE: Primary | ICD-10-CM

## 2022-08-03 DIAGNOSIS — Z13.220 LIPID SCREENING: ICD-10-CM

## 2022-08-03 LAB
CHOLEST SERPL-MCNC: 217 MG/DL
HDLC SERPL-MCNC: 42 MG/DL
LDLC SERPL CALC-MCNC: 150 MG/DL
NONHDLC SERPL-MCNC: 175 MG/DL
TRIGL SERPL-MCNC: 125 MG/DL

## 2022-08-03 PROCEDURE — 86803 HEPATITIS C AB TEST: CPT | Performed by: FAMILY MEDICINE

## 2022-08-03 PROCEDURE — 36415 COLL VENOUS BLD VENIPUNCTURE: CPT | Performed by: FAMILY MEDICINE

## 2022-08-03 PROCEDURE — 0054A COVID-19,PF,PFIZER (12+ YRS): CPT | Performed by: FAMILY MEDICINE

## 2022-08-03 PROCEDURE — 87389 HIV-1 AG W/HIV-1&-2 AB AG IA: CPT | Performed by: FAMILY MEDICINE

## 2022-08-03 PROCEDURE — 99213 OFFICE O/P EST LOW 20 MIN: CPT | Mod: 25 | Performed by: FAMILY MEDICINE

## 2022-08-03 PROCEDURE — 91305 COVID-19,PF,PFIZER (12+ YRS): CPT | Performed by: FAMILY MEDICINE

## 2022-08-03 PROCEDURE — 80061 LIPID PANEL: CPT | Performed by: FAMILY MEDICINE

## 2022-08-03 NOTE — PROGRESS NOTES
"  Assessment & Plan     Encounter to establish care: no particular concerns today, will get screening labs. Return with any concerns.     High priority for COVID-19 vaccination  - COVID-19,PF,PFIZER (12+ Yrs GRAY LABEL)    Screening for HIV (human immunodeficiency virus)    - HIV Antigen Antibody Combo  - HIV Antigen Antibody Combo    Encounter for hepatitis C screening test for low risk patient  - Hepatitis C antibody  - Hepatitis C antibody    Lipid screening  - Lipid Profile  - Lipid Profile         BMI:   Estimated body mass index is 28.72 kg/m  as calculated from the following:    Height as of this encounter: 1.492 m (4' 10.75\").    Weight as of this encounter: 64 kg (141 lb).         No follow-ups on file.    Betsy Rivers MD  [facility] JESE Roque is a 21 year old, presenting for the following health issues:  Establish Care      Other    History of Present Illness       Reason for visit:  Establish Care    She eats 2-3 servings of fruits and vegetables daily.She consumes 0 sweetened beverage(s) daily.She exercises with enough effort to increase her heart rate 9 or less minutes per day.  She exercises with enough effort to increase her heart rate 3 or less days per week.   She is taking medications regularly.     Here to establish care.     She is going to [employer]. This is her first year attending. 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         Objective    Ht 1.492 m (4' 10.75\")   Wt 64 kg (141 lb)   LMP 07/26/2022   Breastfeeding No   BMI 28.72 kg/m    Body mass index is 28.72 kg/m .  Physical Exam   GENERAL: healthy, alert and no distress  MS: no gross musculoskeletal defects noted, no edema  SKIN: no suspicious lesions or rashes  NEURO: Normal strength and tone, mentation intact and speech normal  PSYCH: mentation appears normal, affect normal/bright              .  ..  "

## 2022-08-04 LAB
HCV AB SERPL QL IA: NONREACTIVE
HIV 1+2 AB+HIV1 P24 AG SERPL QL IA: NONREACTIVE

## 2022-08-06 ENCOUNTER — TELEPHONE (OUTPATIENT)
Dept: FAMILY MEDICINE | Facility: CLINIC | Age: 22
End: 2022-08-06

## 2022-08-06 NOTE — TELEPHONE ENCOUNTER
Please call to let pt know she has slightly high cholesterol (this is a type of fat in her blood). I would recommend trying to get more exercise, even if it's just walking more often, and trying to eat more vegetables.     Dr. Rivers

## 2022-08-08 NOTE — TELEPHONE ENCOUNTER
Called patient with the assistance of an  to relay provider message below.  Patient verbalized understood recommendation.      XI Quiroz, RN  [facility]

## 2022-11-04 ENCOUNTER — OFFICE VISIT (OUTPATIENT)
Dept: FAMILY MEDICINE | Facility: CLINIC | Age: 22
End: 2022-11-04
Payer: COMMERCIAL

## 2022-11-04 VITALS
WEIGHT: 141 LBS | OXYGEN SATURATION: 98 % | HEIGHT: 59 IN | BODY MASS INDEX: 28.43 KG/M2 | HEART RATE: 73 BPM | RESPIRATION RATE: 12 BRPM | SYSTOLIC BLOOD PRESSURE: 104 MMHG | TEMPERATURE: 98.5 F | DIASTOLIC BLOOD PRESSURE: 84 MMHG

## 2022-11-04 DIAGNOSIS — Z12.4 CERVICAL CANCER SCREENING: Primary | ICD-10-CM

## 2022-11-04 PROCEDURE — 99213 OFFICE O/P EST LOW 20 MIN: CPT | Mod: 25 | Performed by: FAMILY MEDICINE

## 2022-11-04 PROCEDURE — 87591 N.GONORRHOEAE DNA AMP PROB: CPT | Performed by: FAMILY MEDICINE

## 2022-11-04 PROCEDURE — G0145 SCR C/V CYTO,THINLAYER,RESCR: HCPCS | Performed by: FAMILY MEDICINE

## 2022-11-04 PROCEDURE — 90471 IMMUNIZATION ADMIN: CPT | Performed by: FAMILY MEDICINE

## 2022-11-04 PROCEDURE — 87491 CHLMYD TRACH DNA AMP PROBE: CPT | Performed by: FAMILY MEDICINE

## 2022-11-04 PROCEDURE — 90686 IIV4 VACC NO PRSV 0.5 ML IM: CPT | Performed by: FAMILY MEDICINE

## 2022-11-04 NOTE — PROGRESS NOTES
"  Assessment & Plan     Cervical cancer screening  - Pap Screen only - recommended age 21 - 24 years  - Chlamydia & Gonorrhea by PCR, [facility]         BMI:   Estimated body mass index is 28.72 kg/m  as calculated from the following:    Height as of this encounter: 1.492 m (4' 10.75\").    Weight as of this encounter: 64 kg (141 lb).       No follow-ups on file.    Betsy Rivers MD  [facility] JESE Roque is a 22 year old, presenting for the following health issues:  Gyn Exam (Pap only)      History of Present Illness       Reason for visit:  Pap smear          Objective    /84   Pulse 73   Temp 98.5  F (36.9  C) (Oral)   Resp 12   Ht 1.492 m (4' 10.75\")   Wt 64 kg (141 lb)   LMP 10/15/2022 (Approximate)   SpO2 98%   BMI 28.72 kg/m    Body mass index is 28.72 kg/m .  Physical Exam   GENERAL: healthy, alert and no distress  EYES: Eyes grossly normal to inspection, PERRL and conjunctivae and sclerae normal   (female): normal female external genitalia, normal urethral meatus, vaginal mucosa pink, moist, well rugated, and normal cervix/adnexa/uterus without masses or discharge  MS: no gross musculoskeletal defects noted, no edema  SKIN: no suspicious lesions or rashes  NEURO: Normal strength and tone, mentation intact and speech normal  PSYCH: mentation appears normal, affect normal/bright            "

## 2022-11-05 LAB
C TRACH DNA SPEC QL PROBE+SIG AMP: NEGATIVE
N GONORRHOEA DNA SPEC QL NAA+PROBE: NEGATIVE

## 2022-11-09 LAB
BKR LAB AP GYN ADEQUACY: NORMAL
BKR LAB AP GYN INTERPRETATION: NORMAL
BKR LAB AP HPV REFLEX: NO
BKR LAB AP LMP: NORMAL
BKR LAB AP PREVIOUS ABNORMAL: NORMAL
PATH REPORT.COMMENTS IMP SPEC: NORMAL
PATH REPORT.COMMENTS IMP SPEC: NORMAL
PATH REPORT.RELEVANT HX SPEC: NORMAL

## 2023-03-17 ENCOUNTER — TELEPHONE (OUTPATIENT)
Dept: FAMILY MEDICINE | Facility: CLINIC | Age: 23
End: 2023-03-17
Payer: COMMERCIAL

## 2023-03-17 DIAGNOSIS — Z11.1 SCREENING EXAMINATION FOR PULMONARY TUBERCULOSIS: Primary | ICD-10-CM

## 2023-03-17 NOTE — TELEPHONE ENCOUNTER
Called pt and further assist her. Pt is requesting a TB test for work.    - Dr. Rivers pt would like lab order for TB. Pended order. Please review and sign if appropriate.    Mikhail Aragon, BSN RN  [facility]

## 2023-03-17 NOTE — TELEPHONE ENCOUNTER
Pt came in requested for TB assessment letter for work. Pt had a negative QFT 8/26/2021 and last see provider on 11/4/2021. Pt does not have any symptoms and no exposure. Pt can be reach at [phone].

## 2023-03-20 NOTE — TELEPHONE ENCOUNTER
Writer called patient with the help of a Susan  regarding provider's message below. Provider message relayed to patient.    Lab only appt made for: 3/21/2023 @ 9:00 AM     Patient verbalizes understanding, agrees with plan and has no further questions.    Closing encounter.    Eric Duran, CAITYN, RN   [facility]

## 2023-03-21 ENCOUNTER — LAB (OUTPATIENT)
Dept: LAB | Facility: CLINIC | Age: 23
End: 2023-03-21
Payer: COMMERCIAL

## 2023-03-21 DIAGNOSIS — Z11.1 SCREENING EXAMINATION FOR PULMONARY TUBERCULOSIS: ICD-10-CM

## 2023-03-21 PROCEDURE — 36415 COLL VENOUS BLD VENIPUNCTURE: CPT

## 2023-03-21 PROCEDURE — 86481 TB AG RESPONSE T-CELL SUSP: CPT

## 2023-03-23 LAB
GAMMA INTERFERON BACKGROUND BLD IA-ACNC: 0.52 IU/ML
M TB IFN-G BLD-IMP: NEGATIVE
M TB IFN-G CD4+ BCKGRND COR BLD-ACNC: 9.48 IU/ML
MITOGEN IGNF BCKGRD COR BLD-ACNC: -0.03 IU/ML
MITOGEN IGNF BCKGRD COR BLD-ACNC: -0.04 IU/ML
QUANTIFERON MITOGEN: 10 IU/ML
QUANTIFERON NIL TUBE: 0.52 IU/ML
QUANTIFERON TB1 TUBE: 0.49 IU/ML
QUANTIFERON TB2 TUBE: 0.48

## 2023-08-02 ENCOUNTER — HOSPITAL ENCOUNTER (EMERGENCY)
Facility: HOSPITAL | Age: 23
Discharge: HOME | End: 2023-08-02
Admitting: EMERGENCY MEDICINE
Payer: COMMERCIAL

## 2023-08-02 VITALS
RESPIRATION RATE: 18 BRPM | OXYGEN SATURATION: 96 % | SYSTOLIC BLOOD PRESSURE: 112 MMHG | DIASTOLIC BLOOD PRESSURE: 73 MMHG | WEIGHT: 141.31 LBS | HEART RATE: 96 BPM | BODY MASS INDEX: 28.79 KG/M2 | TEMPERATURE: 98.9 F

## 2023-08-02 DIAGNOSIS — L30.9 DERMATITIS: ICD-10-CM

## 2023-08-02 PROCEDURE — 99283 EMERGENCY DEPT VISIT LOW MDM: CPT

## 2023-08-02 RX ORDER — DIPHENHYDRAMINE HCL 25 MG
25 CAPSULE ORAL 3 TIMES DAILY PRN
Qty: 15 CAPSULE | Refills: 0 | Status: SHIPPED | OUTPATIENT
Start: 2023-08-02 | End: 2023-08-07

## 2023-08-02 ASSESSMENT — ENCOUNTER SYMPTOMS
VOMITING: 0
EYE ITCHING: 0
NAUSEA: 0
SHORTNESS OF BREATH: 0
RHINORRHEA: 0

## 2023-08-02 NOTE — DISCHARGE INSTRUCTIONS
You are seen here today for evaluation of a rash.  As we discussed, this appears consistent with dermatitis which is usually an exposure to something.    I will prescribe you Benadryl to take 3 times daily which should improve the rash.  I would also recommend using a cool compress/washcloth to soothe the skin.    Follow-up with your primary care provider.  I have placed a referral to allergy specialist, they should call you to schedule an appointment but you can call the number listed above if they do not call you.  Return to the ER for any new or worsening symptoms like rash all over your body, swelling of your lips or tongue, difficulty breathing, or any other symptoms that concern you.

## 2023-08-02 NOTE — ED PROVIDER NOTES
EMERGENCY DEPARTMENT ENCOUNTER      NAME: Mya Shaikh  AGE: 22 year old female  YOB: 2000  MRN: [id number]  EVALUATION DATE & TIME: No admission date for patient encounter.    PCP: Betsy Rivers    ED PROVIDER: Flavia De Los Santos PA-C      Chief Complaint   Patient presents with    Rash         FINAL IMPRESSION:  1. Dermatitis          ED COURSE & MEDICAL DECISION MAKING:    Pertinent Labs & Imaging studies reviewed. (See chart for details)    22 year old female presents to the Emergency Department for evaluation of a rash.    Physical exam is remarkable for a generally well-appearing female who is in no acute distress.  Heart and lung sounds are clear diffusely throughout.  Oropharynx is unremarkable appearing, no swelling of the lips or tongue.  Patient has a very faint papular rash on the upper part of the chest wrapping around to the upper part of the back.  It is nontender, no warmth, and blanchable.  Vital signs are stable and she is afebrile.    I do not think any emergent labs or imaging are indicated at this time.  The patient is overall well-appearing here and denies any symptoms concerning for significant allergic reaction/anaphylaxis.  There is no clinical evidence of infection at this time.  Rash does not appear consistent with SJS, TEN, or other malicious etiology of rash.  Symptoms appear most consistent with dermatitis, prescription for Benadryl was sent to her pharmacy.  Advised her to follow-up with her primary care provider.  Patient very interested in allergy testing so I did place a referral to allergy for her.  Advised her to follow-up with them as well.  Recommend return here for any new or worsening symptoms, the patient is agreeable with this treatment plan and verbalized understanding.    Medical Decision Making    History:  Supplemental history from: Documented in chart, if applicable  External Record(s) reviewed: Documented in chart, if applicable.    Work Up:  Chart  documentation includes differential considered and any EKGs or imaging independently interpreted by provider, where specified.  In additional to work up documented, I considered the following work up: Documented in chart, if applicable.    External consultation:  Discussion of management with another provider: Documented in chart, if applicable    Complicating factors:  Care impacted by chronic illness: N/A  Care affected by social determinants of health: N/A    Disposition considerations: Discharge. I prescribed additional prescription strength medication(s) as charted. N/A.    ED Course   12:48 PM Performed my initial history and physical exam. Discussed workup in the emergency department, management of symptoms, and likely disposition. I discussed the plan for discharge with the patient or family and they are agreeable.. We discussed supportive cares at home and reasons for return to the ER including new or worsening symptoms - all questions and concerns addressed. Patient to be discharged by RN.    At the conclusion of the encounter I discussed the results of all of the tests and the disposition. The questions were answered. The patient or family acknowledged understanding and was agreeable with the care plan.     Voice recognition software was used in the creation of this note. Any grammatical or nonsensical errors are due to inherent errors with the software and are not the intention of the writer.     MEDICATIONS GIVEN IN THE EMERGENCY:  Medications - No data to display    NEW PRESCRIPTIONS STARTED AT TODAY'S ER VISIT  Discharge Medication List as of 8/2/2023  1:12 PM        START taking these medications    Details   diphenhydrAMINE (BENADRYL) 25 MG capsule Take 1 capsule (25 mg) by mouth 3 times daily as needed for itching or allergies, Disp-15 capsule, R-0, E-Prescribe                  =================================================================    HPI    Patient information was obtained from: Patient      Use of : N/A         Mya FINLEY Neel is a 22 year old female with no contributory history who presents to this ED by private vehicle for evaluation of rash.     Patient reports a constant itchy, burning rash located on her upper chest, upper back, and upper arms radiating up to her face over the past few days. She states that have been no clear changes to her detergent, soap, or other environmental triggers. She tried an unnamed powder for her symptoms without improvement.     Patient denies shortness of breath, nausea, vomiting, runny nose, congestion, itchy eyes, or additional symptoms or complaints at this time.     REVIEW OF SYSTEMS   Review of Systems   HENT:  Negative for congestion and rhinorrhea.    Eyes:  Negative for itching.   Respiratory:  Negative for shortness of breath.    Gastrointestinal:  Negative for nausea and vomiting.   Skin:  Positive for rash (itchy, burning).   All other systems reviewed and are negative.      All other systems reviewed and are negative unless noted in HPI.      PAST MEDICAL HISTORY:  History reviewed. No pertinent past medical history.    PAST SURGICAL HISTORY:  History reviewed. No pertinent surgical history.    CURRENT MEDICATIONS:    diphenhydrAMINE (BENADRYL) 25 MG capsule        ALLERGIES:  Allergies   Allergen Reactions    Other Food Allergy Hives     Green Gourd       FAMILY HISTORY:  Family History   Problem Relation Age of Onset    Depression Mother        SOCIAL HISTORY:   Social History     Socioeconomic History    Marital status: Single   Tobacco Use    Smoking status: Never     Passive exposure: Yes    Smokeless tobacco: Never    Tobacco comments:     dad smokes outside   Substance and Sexual Activity    Alcohol use: No    Drug use: No    Sexual activity: Never       VITALS:  Patient Vitals for the past 24 hrs:   BP Temp Temp src Pulse Resp SpO2 Weight   08/02/23 1242 112/73 98.9  F (37.2  C) Oral 96 18 96 % 64.1 kg (141 lb 5 oz)       PHYSICAL EXAM     VITAL SIGNS: /73   Pulse 96   Temp 98.9  F (37.2  C) (Oral)   Resp 18   Wt 64.1 kg (141 lb 5 oz)   SpO2 96%   BMI 28.79 kg/m    General Appearance: Alert, cooperative, normal speech and facial symmetry, appears stated age, the patient does not appear in distress  Head:  Normocephalic, without obvious abnormality, atraumatic  Eyes: Conjunctiva/corneas clear, EOM's intact, no nystagmus, PERRL  ENT: No lip or tongue swelling; Lips, mucosa, and tongue normal; teeth and gums normal, no pharyngeal inflammation, no dysphonia or difficulty swallowing, membranes are moist without pallor  Cardio:  Regular rate and rhythm, S1 and S2 normal, no murmur, rub    or gallop, 2+ pulses symmetric in all extremities  Pulm:  Clear to auscultation bilaterally, respirations unlabored with no accessory muscle use  Extremities: Moves all extremities  Skin: Very faint papular rash on the upper part of the chest wrapping around to the upper part of the back.  It is nontender, no warmth, and blanchable.  Neuro: Patient is awake, alert, and responsive to voice. No gross motor weaknesses or sensory loss; moves all extremities.    LAB:  All pertinent labs reviewed and interpreted.  Labs Ordered and Resulted from Time of ED Arrival to Time of ED Departure - No data to display    RADIOLOGY:  Reviewed all pertinent imaging. Please see official radiology report.  No orders to display           Ashu SAUCEDA, am serving as a scribe to document services personally performed by Flavia De Los Santos PA-C based on my observation and the provider's statements to me. I, Flavia De Los Santos PA-C attest that Ashu Ruff is acting in a scribe capacity, has observed my performance of the services and has documented them in accordance with my direction.     Flavia De Los Santos PA-C  Emergency Medicine  [facility] EMERGENCY DEPARTMENT  [facility] [facility] [id number] [phone]  Dept:  [phone]       Flavia De Los Santos PA-C  08/02/23 5719

## 2023-08-02 NOTE — ED TRIAGE NOTES
Pt to triage with report of rash on chest, back and neck. Pt reports rash is itchy.   Last week had similar episode with no known cause  Denies diff breathing, sob, chest pain, n/v  Hasn't taken anything for the rash at home.   Denies new soap, laundry detergents, cleaning products, and food at home        Triage Assessment       Row Name 08/02/23 1243       Triage Assessment (Adult)    Airway WDL WDL       Respiratory WDL    Respiratory WDL WDL       Skin Circulation/Temperature WDL    Skin Circulation/Temperature WDL WDL       Cardiac WDL    Cardiac WDL WDL       Peripheral/Neurovascular WDL    Peripheral Neurovascular WDL WDL       Cognitive/Neuro/Behavioral WDL    Cognitive/Neuro/Behavioral WDL WDL

## 2025-06-09 ENCOUNTER — TELEPHONE (OUTPATIENT)
Dept: FAMILY MEDICINE | Facility: CLINIC | Age: 25
End: 2025-06-09
Payer: COMMERCIAL

## 2025-06-09 DIAGNOSIS — R06.83 SNORING: Primary | ICD-10-CM

## 2025-06-09 RX ORDER — FLUTICASONE PROPIONATE 50 MCG
2 SPRAY, SUSPENSION (ML) NASAL DAILY
Qty: 16 G | Refills: 11 | Status: SHIPPED | OUTPATIENT
Start: 2025-06-09

## 2025-06-10 NOTE — TELEPHONE ENCOUNTER
Patient was present at sister's appointment and asked about medication for snoring. Snoring for past few months. Has tried breathe right strips without improvement. Exam of oropharynx reveals normal tonsils, and posterior oropharynx is not crowded. Endorses allergic rhinitis symptoms, so recommended she try flonase - rx sent. If continuing, recommend clinic appt.